# Patient Record
Sex: MALE | Race: WHITE | NOT HISPANIC OR LATINO | URBAN - METROPOLITAN AREA
[De-identification: names, ages, dates, MRNs, and addresses within clinical notes are randomized per-mention and may not be internally consistent; named-entity substitution may affect disease eponyms.]

---
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
## 2021-08-19 NOTE — ED PROVIDER NOTE - NORMAL, MLM
2nd message  Sorry for another message I have also noticed being off and antidepressant and anxiety medicine I am more on edge, irritable, I can’t focus, I’m restless, poor energy. Talking with my therapist she thinks it’s ptsd, adhd, and anxiety, not so much depression. I can’t get into the person you referred me to until October. The therapist thinks I should be treated for adhd first since the antidepressant are not working.      Please advise.  
Another neuropsychologist would be dr egan, but again until you have neuropsych evaluation before prescribing stimulants  You can stop the atarax  
I will rx the lorazepam but it has to be used as needed    Which pharmacy  
PDMP reviewed; no aberrant behavior identified, prescription authorized.  E-prescribed    
Pharmacy loaded   
Routing to MD-please see patient reply and advise   
Sent reply below per MD   Another neuropsychologist would be Dr Torre.  I won't be prescribing treatment for ADHD.  You can stop the atarax.  Sylvester  
jenniefr all pertinent systems normal

## 2022-11-30 NOTE — ED PEDIATRIC NURSE NOTE - NSSISCREENINGQ1_ED_A_ED
Chart and labs reviewed for length of stay since admission. Pt continues to remain at low nutritional risk and intervention is not indicated. Nutrition Services to sign off. RD available by consult.     No

## 2023-01-06 ENCOUNTER — NON-APPOINTMENT (OUTPATIENT)
Age: 23
End: 2023-01-06

## 2023-01-06 ENCOUNTER — APPOINTMENT (OUTPATIENT)
Dept: DERMATOLOGY | Facility: CLINIC | Age: 23
End: 2023-01-06
Payer: COMMERCIAL

## 2023-01-06 DIAGNOSIS — Z78.9 OTHER SPECIFIED HEALTH STATUS: ICD-10-CM

## 2023-01-06 PROCEDURE — 99204 OFFICE O/P NEW MOD 45 MIN: CPT

## 2023-01-06 RX ORDER — INSULIN DEGLUDEC INJECTION 100 U/ML
INJECTION, SOLUTION SUBCUTANEOUS
Refills: 0 | Status: ACTIVE | COMMUNITY

## 2023-01-06 RX ORDER — KETOCONAZOLE 20 MG/G
2 CREAM TOPICAL
Qty: 1 | Refills: 2 | Status: ACTIVE | COMMUNITY
Start: 2023-01-06 | End: 1900-01-01

## 2023-01-06 RX ORDER — INSULIN ASPART 100 [IU]/ML
INJECTION, SOLUTION INTRAVENOUS; SUBCUTANEOUS
Refills: 0 | Status: ACTIVE | COMMUNITY

## 2023-01-06 RX ORDER — CEPHALEXIN 500 MG/1
500 CAPSULE ORAL
Qty: 14 | Refills: 0 | Status: ACTIVE | COMMUNITY
Start: 2023-01-06 | End: 1900-01-01

## 2023-01-06 NOTE — PHYSICAL EXAM
[FreeTextEntry3] : AAOx3, pleasant, NAD, no visual lymphadenopathy\par hair, scalp, face, nose, eyelids, ears, lips, oropharynx, neck, chest, abdomen, back, right arm, left arm, nails, and hands examined with all normal findings,\par pertinent findings include:\par \par annular patch on penis x2\par erythema on posterior leg

## 2023-01-06 NOTE — ASSESSMENT
[FreeTextEntry1] : rash\par faint\par ?balanitis\par mix triamcinolone 0.1% cream with ketoconazole cream BID x2 weeks; SED\par add keflex 500 mg PO x 7 days for ? folliculitis on legs; SED\par \par f/u PRN

## 2023-01-06 NOTE — HISTORY OF PRESENT ILLNESS
[FreeTextEntry1] : rash [de-identified] : 22 year old male here for rash on penis. Type 1 DM. itchy. posterior legs itchy as well.

## 2023-01-27 ENCOUNTER — RX RENEWAL (OUTPATIENT)
Age: 23
End: 2023-01-27

## 2023-01-27 RX ORDER — TRIAMCINOLONE ACETONIDE 1 MG/G
0.1 CREAM TOPICAL
Qty: 90 | Refills: 0 | Status: ACTIVE | COMMUNITY
Start: 2023-01-06 | End: 1900-01-01

## 2023-06-26 ENCOUNTER — APPOINTMENT (OUTPATIENT)
Dept: DERMATOLOGY | Facility: CLINIC | Age: 23
End: 2023-06-26
Payer: COMMERCIAL

## 2023-06-26 DIAGNOSIS — D48.5 NEOPLASM OF UNCERTAIN BEHAVIOR OF SKIN: ICD-10-CM

## 2023-06-26 DIAGNOSIS — L72.3 SEBACEOUS CYST: ICD-10-CM

## 2023-06-26 DIAGNOSIS — N48.1 BALANITIS: ICD-10-CM

## 2023-06-26 PROCEDURE — 99214 OFFICE O/P EST MOD 30 MIN: CPT | Mod: 25

## 2023-06-26 PROCEDURE — 10160 PNXR ASPIR ABSC HMTMA BULLA: CPT

## 2023-06-26 RX ORDER — DOXYCYCLINE HYCLATE 100 MG/1
100 TABLET ORAL TWICE DAILY
Qty: 20 | Refills: 0 | Status: ACTIVE | COMMUNITY
Start: 2023-06-26 | End: 1900-01-01

## 2023-06-26 RX ORDER — CHLORHEXIDINE GLUCONATE 213 G/1000ML
4 SOLUTION TOPICAL
Qty: 1 | Refills: 4 | Status: ACTIVE | COMMUNITY
Start: 2023-06-26 | End: 1900-01-01

## 2023-06-26 RX ORDER — MUPIROCIN 20 MG/G
2 OINTMENT TOPICAL
Qty: 1 | Refills: 3 | Status: ACTIVE | COMMUNITY
Start: 2023-06-26 | End: 1900-01-01

## 2023-06-26 NOTE — PHYSICAL EXAM
[FreeTextEntry3] : AAOx3, pleasant, NAD, no visual lymphadenopathy\par hair, scalp, face, nose, eyelids, ears, lips, oropharynx, neck, chest, abdomen, back, right arm, left arm, nails, and hands examined with all normal findings,\par pertinent findings include:\par \par follicular papules on legs and penis\par inflamed cystic nodule on right groin\par soft mobile nodule on right breast\par

## 2023-06-26 NOTE — HISTORY OF PRESENT ILLNESS
[FreeTextEntry1] : rash [de-identified] : 23 year old male here with rash on legs and penis. growth on right groin, painful.

## 2023-06-26 NOTE — ASSESSMENT
[FreeTextEntry1] : folliculitis\par education\par doxy 100 mg PO BID x2 weeks; SED\par hibiclens daily; SED\par mupirocin in nares x 7 days; SED\par \par inflamed cystic nodule\par defers ILK\par aspiration I&D; 1 cc fluid drained; SED\par doxy as above; SED\par \par NUB; right chest\par favor lipoma; u/s for confirmation\par \par f/;u 4 weeks\par \par patient vasovagal prior to I&D but felt normal after several minutes

## 2023-07-25 ENCOUNTER — APPOINTMENT (OUTPATIENT)
Dept: DERMATOLOGY | Facility: CLINIC | Age: 23
End: 2023-07-25
Payer: COMMERCIAL

## 2023-07-25 DIAGNOSIS — B86 SCABIES: ICD-10-CM

## 2023-07-25 DIAGNOSIS — L73.9 FOLLICULAR DISORDER, UNSPECIFIED: ICD-10-CM

## 2023-07-25 DIAGNOSIS — R21 RASH AND OTHER NONSPECIFIC SKIN ERUPTION: ICD-10-CM

## 2023-07-25 LAB
HIV1+2 AB SPEC QL IA.RAPID: NONREACTIVE
HSV1-DNA: NOT DETECTED
HSV2-DNA: NOT DETECTED

## 2023-07-25 PROCEDURE — 99214 OFFICE O/P EST MOD 30 MIN: CPT

## 2023-07-25 RX ORDER — TRIAMCINOLONE ACETONIDE 1 MG/G
0.1 OINTMENT TOPICAL TWICE DAILY
Qty: 1 | Refills: 3 | Status: ACTIVE | COMMUNITY
Start: 2023-07-25 | End: 1900-01-01

## 2023-07-25 RX ORDER — PERMETHRIN 50 MG/G
5 CREAM TOPICAL
Qty: 2 | Refills: 0 | Status: ACTIVE | COMMUNITY
Start: 2023-07-25 | End: 1900-01-01

## 2023-07-25 NOTE — PHYSICAL EXAM
[FreeTextEntry3] : AAOx3, pleasant, NAD, no visual lymphadenopathy\par hair, scalp, face, nose, eyelids, ears, lips, oropharynx, neck, chest, abdomen, back, right arm, left arm, nails, and hands examined with all normal findings,\par pertinent findings include:\par \par erythema btwn finger webs, penis, arms

## 2023-07-25 NOTE — ASSESSMENT
[FreeTextEntry1] : rash\par favor scabies today\par education and prognosis\par clean\par permethrin then repeat in 1 week; SED\par \par TAC BID PRN itch; SED\par \par f/u PRN

## 2023-07-25 NOTE — HISTORY OF PRESENT ILLNESS
[FreeTextEntry1] : f/u rash [de-identified] : 23 year old male here with f/u rash on arms. spreading to finger webs and penis.\par

## 2023-08-09 NOTE — ED PROVIDER NOTE - DISCHARGE DATE
22-Nov-2017 Mucosal Advancement Flap Text: Given the location of the defect, shape of the defect and the proximity to free margins a mucosal advancement flap was deemed most appropriate. Incisions were made with a 15 blade scalpel in the appropriate fashion along the cutaneous vermilion border and the mucosal lip. The remaining actinically damaged mucosal tissue was excised.  The mucosal advancement flap was then elevated to the gingival sulcus with care taken to preserve the neurovascular structures and advanced into the primary defect. Care was taken to ensure that precise realignment of the vermilion border was achieved.

## 2023-08-22 ENCOUNTER — APPOINTMENT (OUTPATIENT)
Dept: DERMATOLOGY | Facility: CLINIC | Age: 23
End: 2023-08-22

## 2024-04-10 NOTE — ED PROVIDER NOTE - CPE EDP [facility] NORM
The patient and her son were given a list of outpatient mental health providers, as well as the phone number for the [facility].   normal...

## 2024-05-02 NOTE — ED PROVIDER NOTE - NS ED MD EM SELECTION
Mother has called in to see if the clinic can fax over the progress notes and order form 04/12/24.     Progress notes and order were faxed to: [phone].     Cami Freeman      [id number] Detailed

## 2024-06-30 ENCOUNTER — NON-APPOINTMENT (OUTPATIENT)
Age: 24
End: 2024-06-30

## 2024-07-19 ENCOUNTER — TRANSCRIPTION ENCOUNTER (OUTPATIENT)
Age: 24
End: 2024-07-19

## 2025-08-04 NOTE — ED PEDIATRIC NURSE REASSESSMENT NOTE - TEMPLATE LIST FOR HEAD TO TOE ASSESSMENT
Psych/Behavioral Post-Care Instructions: I reviewed with the patient in detail post-care instructions. Patient is to keep the biopsy site dry overnight, and then apply polysporin twice daily until healed. Patient may cleanse with hydrogen peroxide mixed with water.